# Patient Record
Sex: MALE | Race: WHITE | Employment: PART TIME | URBAN - METROPOLITAN AREA
[De-identification: names, ages, dates, MRNs, and addresses within clinical notes are randomized per-mention and may not be internally consistent; named-entity substitution may affect disease eponyms.]

---

## 2018-12-30 ENCOUNTER — APPOINTMENT (OUTPATIENT)
Dept: GENERAL RADIOLOGY | Facility: HOSPITAL | Age: 18
End: 2018-12-30
Attending: EMERGENCY MEDICINE
Payer: COMMERCIAL

## 2018-12-30 ENCOUNTER — HOSPITAL ENCOUNTER (EMERGENCY)
Facility: HOSPITAL | Age: 18
Discharge: HOME | End: 2018-12-31
Attending: EMERGENCY MEDICINE
Payer: COMMERCIAL

## 2018-12-30 DIAGNOSIS — S02.2XXA CLOSED FRACTURE OF NASAL BONE, INITIAL ENCOUNTER: ICD-10-CM

## 2018-12-30 DIAGNOSIS — S01.81XA LACERATION OF SKIN OF FACE, INITIAL ENCOUNTER: ICD-10-CM

## 2018-12-30 DIAGNOSIS — I95.1 ORTHOSTATIC SYNCOPE: Primary | ICD-10-CM

## 2018-12-30 PROCEDURE — 21310 PR CLOSED TX NASAL BONE FX WITHOUT MANIPULATION: CPT | Performed by: EMERGENCY MEDICINE

## 2018-12-30 PROCEDURE — 93010 ELECTROCARDIOGRAM REPORT: CPT | Performed by: EMERGENCY MEDICINE

## 2018-12-30 PROCEDURE — 99283 EMERGENCY DEPT VISIT LOW MDM: CPT | Performed by: EMERGENCY MEDICINE

## 2018-12-30 PROCEDURE — 21310 HC CLOSED TX NASAL BONE FX WITHOUT MANIPULATION: CPT | Performed by: EMERGENCY MEDICINE

## 2018-12-30 PROCEDURE — 70160 X-RAY EXAM OF NASAL BONES: CPT | Performed by: EMERGENCY MEDICINE

## 2018-12-30 PROCEDURE — 93005 ELECTROCARDIOGRAM TRACING: CPT

## 2018-12-30 RX ORDER — ESTRADIOL 2 MG/1
2 TABLET ORAL DAILY
COMMUNITY

## 2018-12-30 RX ORDER — SPIRONOLACTONE 50 MG/1
50 TABLET, FILM COATED ORAL 2 TIMES DAILY
COMMUNITY

## 2018-12-31 VITALS
HEIGHT: 68 IN | WEIGHT: 126 LBS | OXYGEN SATURATION: 98 % | HEART RATE: 90 BPM | SYSTOLIC BLOOD PRESSURE: 115 MMHG | BODY MASS INDEX: 19.1 KG/M2 | TEMPERATURE: 97 F | RESPIRATION RATE: 18 BRPM | DIASTOLIC BLOOD PRESSURE: 87 MMHG

## 2018-12-31 LAB
ATRIAL RATE: 83 BPM
P AXIS: 80 DEGREES
P-R INTERVAL: 152 MS
Q-T INTERVAL: 362 MS
QRS DURATION: 86 MS
QTC CALCULATION (BEZET): 425 MS
R AXIS: 72 DEGREES
T AXIS: 79 DEGREES
VENTRICULAR RATE: 83 BPM

## 2018-12-31 NOTE — ED INITIAL ASSESSMENT (HPI)
Pt presents to ED with complaint of syncope. Pt reports he had a syncopal episode after getting out of the hot bathtub about 2 hours PTA. Reports hitting nose on floor. Small laceration noted to bridge of nose.  +swelling to nose

## 2018-12-31 NOTE — ED PROVIDER NOTES
Patient Seen in: [facility] Emergency Department    History   Patient presents with:  Syncope (cardiovascular, neurologic)    Stated Complaint: syncopal episode in bathroom     HPI    18-year-old male who presents to the emergency department after a hematoma on examination of the nose. He does have an abrasion across the bridge of his nose with swelling at the bridge of the nose. Slight tenderness to palpation. Oral mucosa moist.  Tongue is midline. No posterior pharyngeal lesions.   Tympanic membe any invasive procedure performed at this time. The patient was discharged in good condition.             Disposition and Plan     Clinical Impression:  Orthostatic syncope  (primary encounter diagnosis)  Closed fracture of nasal bone, initial encounter  [place]

## (undated) NOTE — ED AVS SNAPSHOT
Kirstin Gonzalez   MRN: [id number]    Department:  [facility] Emergency Department   Date of Visit:  12/30/2018           Disclosure     Insurance plans vary and the physician(s) referred by the ER may not be covered by your plan.  Please contact y tell this physician (or your personal doctor if your instructions are to return to your personal doctor) about any new or lasting problems. The primary care or specialist physician will see patients referred from the [facility] Emergency Department.  Salvadore Skiff